# Patient Record
Sex: MALE | URBAN - METROPOLITAN AREA
[De-identification: names, ages, dates, MRNs, and addresses within clinical notes are randomized per-mention and may not be internally consistent; named-entity substitution may affect disease eponyms.]

---

## 2017-12-25 ENCOUNTER — HOSPITAL ENCOUNTER (EMERGENCY)
Facility: CLINIC | Age: 37
Discharge: HOME | End: 2017-12-25
Attending: EMERGENCY MEDICINE | Admitting: EMERGENCY MEDICINE

## 2017-12-25 ENCOUNTER — APPOINTMENT (OUTPATIENT)
Dept: GENERAL RADIOLOGY | Facility: CLINIC | Age: 37
End: 2017-12-25
Attending: EMERGENCY MEDICINE

## 2017-12-25 VITALS
DIASTOLIC BLOOD PRESSURE: 88 MMHG | SYSTOLIC BLOOD PRESSURE: 114 MMHG | OXYGEN SATURATION: 90 % | RESPIRATION RATE: 18 BRPM | TEMPERATURE: 97.4 F

## 2017-12-25 DIAGNOSIS — F10.920 ALCOHOLIC INTOXICATION WITHOUT COMPLICATION (H): ICD-10-CM

## 2017-12-25 DIAGNOSIS — R25.1 SHAKINESS: ICD-10-CM

## 2017-12-25 LAB
ALBUMIN SERPL-MCNC: 4 G/DL (ref 3.4–5)
ALP SERPL-CCNC: 132 U/L (ref 40–150)
ALT SERPL W P-5'-P-CCNC: 44 U/L (ref 0–70)
AMPHETAMINES UR QL SCN: NEGATIVE
ANION GAP SERPL CALCULATED.3IONS-SCNC: 12 MMOL/L (ref 3–14)
AST SERPL W P-5'-P-CCNC: 30 U/L (ref 0–45)
BARBITURATES UR QL: NEGATIVE
BASOPHILS # BLD AUTO: 0 10E9/L (ref 0–0.2)
BASOPHILS NFR BLD AUTO: 0.2 %
BENZODIAZ UR QL: NEGATIVE
BILIRUB SERPL-MCNC: 0.2 MG/DL (ref 0.2–1.3)
BUN SERPL-MCNC: 10 MG/DL (ref 7–30)
CALCIUM SERPL-MCNC: 8.4 MG/DL (ref 8.5–10.1)
CANNABINOIDS UR QL SCN: NEGATIVE
CHLORIDE SERPL-SCNC: 108 MMOL/L (ref 94–109)
CO2 SERPL-SCNC: 21 MMOL/L (ref 20–32)
COCAINE UR QL: NEGATIVE
CREAT SERPL-MCNC: 0.93 MG/DL (ref 0.66–1.25)
DIFFERENTIAL METHOD BLD: NORMAL
EOSINOPHIL # BLD AUTO: 0.2 10E9/L (ref 0–0.7)
EOSINOPHIL NFR BLD AUTO: 3.5 %
ERYTHROCYTE [DISTWIDTH] IN BLOOD BY AUTOMATED COUNT: 13.1 % (ref 10–15)
ETHANOL SERPL-MCNC: 0.22 G/DL
GFR SERPL CREATININE-BSD FRML MDRD: >90 ML/MIN/1.7M2
GLUCOSE SERPL-MCNC: 112 MG/DL (ref 70–99)
HCT VFR BLD AUTO: 47.9 % (ref 40–53)
HGB BLD-MCNC: 16 G/DL (ref 13.3–17.7)
IMM GRANULOCYTES # BLD: 0 10E9/L (ref 0–0.4)
IMM GRANULOCYTES NFR BLD: 0 %
LYMPHOCYTES # BLD AUTO: 1.6 10E9/L (ref 0.8–5.3)
LYMPHOCYTES NFR BLD AUTO: 33.1 %
MCH RBC QN AUTO: 29.9 PG (ref 26.5–33)
MCHC RBC AUTO-ENTMCNC: 33.4 G/DL (ref 31.5–36.5)
MCV RBC AUTO: 89 FL (ref 78–100)
MONOCYTES # BLD AUTO: 0.4 10E9/L (ref 0–1.3)
MONOCYTES NFR BLD AUTO: 7.9 %
NEUTROPHILS # BLD AUTO: 2.7 10E9/L (ref 1.6–8.3)
NEUTROPHILS NFR BLD AUTO: 55.3 %
NRBC # BLD AUTO: 0 10*3/UL
NRBC BLD AUTO-RTO: 0 /100
OPIATES UR QL SCN: NEGATIVE
PCP UR QL SCN: NEGATIVE
PLATELET # BLD AUTO: 255 10E9/L (ref 150–450)
POTASSIUM SERPL-SCNC: 3.2 MMOL/L (ref 3.4–5.3)
PROT SERPL-MCNC: 8.5 G/DL (ref 6.8–8.8)
RBC # BLD AUTO: 5.36 10E12/L (ref 4.4–5.9)
SODIUM SERPL-SCNC: 141 MMOL/L (ref 133–144)
TROPONIN I SERPL-MCNC: <0.015 UG/L (ref 0–0.04)
WBC # BLD AUTO: 4.8 10E9/L (ref 4–11)

## 2017-12-25 PROCEDURE — 80320 DRUG SCREEN QUANTALCOHOLS: CPT | Performed by: EMERGENCY MEDICINE

## 2017-12-25 PROCEDURE — 71020 XR CHEST 2 VW: CPT

## 2017-12-25 PROCEDURE — 85025 COMPLETE CBC W/AUTO DIFF WBC: CPT | Performed by: EMERGENCY MEDICINE

## 2017-12-25 PROCEDURE — 96360 HYDRATION IV INFUSION INIT: CPT

## 2017-12-25 PROCEDURE — 84484 ASSAY OF TROPONIN QUANT: CPT | Performed by: EMERGENCY MEDICINE

## 2017-12-25 PROCEDURE — 25000128 H RX IP 250 OP 636: Performed by: EMERGENCY MEDICINE

## 2017-12-25 PROCEDURE — 96361 HYDRATE IV INFUSION ADD-ON: CPT

## 2017-12-25 PROCEDURE — 93005 ELECTROCARDIOGRAM TRACING: CPT

## 2017-12-25 PROCEDURE — 99285 EMERGENCY DEPT VISIT HI MDM: CPT | Mod: 25

## 2017-12-25 PROCEDURE — 80307 DRUG TEST PRSMV CHEM ANLYZR: CPT | Performed by: EMERGENCY MEDICINE

## 2017-12-25 PROCEDURE — 80053 COMPREHEN METABOLIC PANEL: CPT | Performed by: EMERGENCY MEDICINE

## 2017-12-25 RX ADMIN — SODIUM CHLORIDE 1000 ML: 9 INJECTION, SOLUTION INTRAVENOUS at 18:02

## 2017-12-25 ASSESSMENT — ENCOUNTER SYMPTOMS
TREMORS: 1
ABDOMINAL PAIN: 0
CHOKING: 1
NERVOUS/ANXIOUS: 1
VOMITING: 0
NAUSEA: 0
SHORTNESS OF BREATH: 1

## 2017-12-25 NOTE — ED PROVIDER NOTES
"  History     Chief Complaint:  Shaking    The history is provided by the patient. A  was used.      Lamont Aguilar is a 37 year old male who presents to the emergency department today with fast heart rate and a feeling of shaky. The patient reports he is nervous, shaking, has a headache that started half an hour ago while the patient was eating and feels like he is choking with shortness of breath. The patient did take some tylenol afterwards and called his friend, who brought him to the ER. The patient denies vomiting, nausea, abdominal pain, history of diabetes, hypertension, asthma. Of note, the patient was drinking earlier today. Patient denies the usage of other illicit substances.      Allergies:  No Known Drug Allergies     Medications:    Atenolol    Past Medical History:    Hyperthyroidism    Past Surgical History:    History reviewed. No pertinent past surgical history.    Family History:    History reviewed. No pertinent family history.     Social History:  The patient was accompanied to the ED by friend.  Alcohol Use: Yes     Review of Systems   Respiratory: Positive for choking and shortness of breath.    Cardiovascular: Positive for chest pain.   Gastrointestinal: Negative for abdominal pain, nausea and vomiting.   Neurological: Positive for tremors.   Psychiatric/Behavioral: The patient is nervous/anxious.    All other systems reviewed and are negative.  Pt arrives with c/o shaking and \"choking\" sensation about 30 mins PTA. Last drink about 30 mins PTA.    Physical Exam     Patient Vitals for the past 24 hrs:   BP Temp Temp src Heart Rate Resp SpO2   12/25/17 1945 114/88 - - - - 90 %   12/25/17 1915 116/74 - - - - 95 %   12/25/17 1900 108/72 - - - - -   12/25/17 1845 107/71 - - - - 99 %   12/25/17 1830 100/63 - - - - 100 %   12/25/17 1815 115/67 - - - - 93 %   12/25/17 1745 - - - - - 96 %   12/25/17 1730 (!) 159/98 97.4  F (36.3  C) Oral 96 18 97 %        Physical Exam  GEN: " patient appears anxious, friend at the bedside.  Smells of alcohol  HEAD: atraumatic, normocephalic  EYES: pupils reactive (3plus to 2plus), extraocular muscles intact but horizontal nystagmus, conjunctivae normal  ENT: TMs flat and white bilaterally, oropharynx normal with no erythema or exudate, mucus membranes dry  NECK: no cervical LAD  RESPIRATORY: no tachypnea, breath sounds clear to auscultation (no rales, wheezes, rhonchi), chest wall nontender, normal phonation  CVS: normal S1/S2, no murmurs/rubs/gallops  ABDOMEN: soft, nontender, no masses or organomegaly, no rebound, decreased bowel sounds  BACK:  no spinal tenderness  EXTREMITIES: intact pulses x 4, full range of motion at joints, no edema  SKIN: warm and dry  NEURO: GCS 15, cranial nerves intact.  Motor- moves all 4 extremities with 5/5 strength.  Sensation- intact.  Coordination- ambulatory.  Overall symmetrical exam  HEME: no bruising or petechiae/contusions  LYMPH: no lymphadenopathy    Emergency Department Course     ECG:  Indication: shortness of breath   Completed at 1740.  Read at 1745.   Sinus Tachycardia   Right atrial enlargement   Nonspecific ST Abnormality  Abnormal ECG   Rate 106 bpm. WV interval 134. QRS duration 82. QT/QTc 348/462. P-R-T axes 36 58 39.   Normal axis.  No STEMI.    Imaging:  Radiology findings were communicated with the patient who voiced understanding of the findings.    XR Chest 2 views  IMPRESSION: Lung volumes are slightly low. No focal airspace opacity. No pleural effusion or pneumothorax. Cardiac silhouette within normal limits.  Report per radiology      Laboratory:  Laboratory findings were communicated with the patient who voiced understanding of the findings.  Drug abuse screen 77 urine: All Negative  Alcohol ethyl: 0.22(HH)  CBC: WNL (WBC 4.8, HGB 16.0, )  CMP: 112(H) Glucose, 3.1(L) Potassium, 8.4(L) Calcium o/w WNL (Creatinine 0.93)   Troponin (Collected 1748): <0.015     Interventions:  1802: 0.9% NaCl  "1L IV Bolus   [place]  Cardiac/Sp02 monitoring    Emergency Department Course:  Nursing notes and vitals reviewed.  1733: I performed an exam of the patient as documented above.   IV was inserted and blood was drawn for laboratory testing, results above.  The patient provided a urine sample here in the emergency department. This was sent for laboratory testing, findings above.  The patient was sent for a Chest XR while in the emergency department, results above.    1957: Patient rechecked and updated.    1956:Findings and plan explained to the Patient. Patient discharged home with instructions regarding supportive care, medications, and reasons to return. The importance of close follow-up was reviewed. I personally reviewed the laboratory and imaging results with the Patient and answered all related questions prior to discharge.   /88  Temp 97.4  F (36.3  C) (Oral)  Resp 18  SpO2 90%      Impression & Plan      Medical Decision Making:  Lamont Aguilar is a 37 year old male who admits to drinking alcohol today and was brought in by his friend because he was feeling \"weird, he felt very shaky, as though he was going to pass out, and felt as though it was hard to breathe. The patient had an IV placed and a  was used. EKG did not show any ST elevation myocardial infarction, buy there was sinus tachycardia. IV fluids were administered. Chest XR did not show any evidence of pneumothorax or infiltrate. His alcohol screen was positive at 0.22. He is not anemic and his electrolytes were normal. Troponin did not show any ischemia. The patient was observed for about 2 and a half hours in the ER and he felt better. He was no longer shaking, no longer shortness of breath, and his heart rate had decreased. He did have a sober friend to pick him up and take him home. He was advised to abstain from alcohol. Drugs of abuse is negative and patient denies taking any illicit substances.    Diagnosis:    " ICD-10-CM    1. Alcoholic intoxication without complication (H) F10.920    2. Shakiness R25.1        Disposition:  discharged to home    Scribe Disclosure:  I, Heaven Singh, am serving as a scribe at 6:00 PM on 12/25/2017 to document services personally performed by Krystal Lopez MD based on my observations and the provider's statements to me.     12/25/2017   [facility] EMERGENCY DEPARTMENT       Krystal Lopez MD  12/25/17 8818

## 2017-12-25 NOTE — ED AVS SNAPSHOT
[facility] Emergency Department    [address]    [facility] [address]    Phone:  [phone]    Fax:  [phone]                                       Lamont Aguilar   MRN: [id number]    Department:  [facility] Emergency Department   Date of Visit:  12/25/2017           After Visit Summary Signature Page     I have received my discharge instructions, and my questions have been answered. I have discussed any challenges I see with this plan with the nurse or doctor.    ..........................................................................................................................................  Patient/Patient Representative Signature      ..........................................................................................................................................  Patient Representative Print Name and Relationship to Patient    ..................................................               ................................................  Date                                            Time    ..........................................................................................................................................  Reviewed by Signature/Title    ...................................................              ..............................................  Date                                                            Time

## 2017-12-25 NOTE — ED AVS SNAPSHOT
[facility] Emergency Department    [address]    [facility] [address]    Phone:  [phone]    Fax:  [phone]                                       Lamont Aguilar   MRN: [id number]    Department:  [facility] Emergency Department   Date of Visit:  12/25/2017           Patient Information     Date Of Birth          1980        Your diagnoses for this visit were:     Alcoholic intoxication without complication (H)     Shakiness        You were seen by Krystal Lopez MD.      Follow-up Information     Follow up with Randi Mandujano    Specialty:  INTERNAL MEDICINE - ENDOCRINOLOGY, DIABETES & METABOLISM    Contact information:    [address] [facility] [address]  [address] [facility] [address]  [phone]          Discharge Instructions         Intoxicación Por Alcohol [Alcohol Intoxication]  La intoxicación por alcohol se da cuando usted juan alcohol más rápido de lo que el hígado puede trabajar para eliminarlo del sistema. La intoxicación por alcohol afecta morse juicio (pensamientos) y morse coordinación. Un nivel muy alto de alcohol en la ankita puede provocar un coma, hacer que la respiración se vuelva muy lenta, e incluso puede causarle la muerte.  Si reuben alcohol todos los días, eso puede ir, poco a poco, causándole daños permanentes en el hígado, el cerebro, el corazón, el páncreas y otros órganos. El consumo de alcohol sabrina el embarazo puede ocasionar daños permanentes al bebé que está creciendo.  Cuidados En [place]:    No joão más alcohol.    NO CONDUZCA hasta que hayan desaparecido los efectos del alcohol.    Descanse mucho en los próximos días. João abundantes líquidos y otras bebidas no alcohólicas. Trate de comer a intervalos regulares.    Si ha estado bebiendo mucho todos los días, es posible que sienta el sîndrome de abstinencia del alcohol (alcohol withdrawl). A esto también se lo conoce jennifer  la temblorina  (temblores van) o  " delirium tremens  (DTs). Los síntomas usuales kasper de 3 a 4 días y pueden incluir nerviosismo (nervousness), temblores (shakiness), náuseas (nausea), sudor (sweating) o insomnio (no poder dormir [sleeplessness]). Lo mejor sabrina sophie período es que usted permanezca con morse mervat o con amigos que puedan ayudarlo y contenerlo. También puede inscribirse en algún programa residencial de desintoxicación (residential detox program). Si esme síntomas son graves, comuníquese con morse médico para que le recete algún medicamento que pueda ayudarlo.  Visita De Control:  Si el alcohol le está causando problemas, alguna de las siguientes organizaciones, entre otras, pueden serle de ayuda:  Alcohólicos Anónimos (Alcoholics Anonymous) ofrece contención a través de grupos de autoayuda. No se cobran cuotas ni honorarios. Consulte las Páginas Anirudh y llame para conocer el horario y el lugar donde se realizan las reuniones. www.aa.org  Al-Anon ofrece apoyo para alcohólicos y familiares de alcohólicos. [phone] www.al-anon.org  National [place] On Alcoholism And Drug Dependence (Consejo Nacional sobre Alcoholismo y Drogadicción) 727.504.7909 www.ncadd.org  Existen también programas hospitalización o residenciales de desintoxicación alcohólica. Consulte las Páginas Anirudh, en la sección \"Drug Abuse & Treatment Centers\" (Drogadicción y Centros de Tratamiento).  Busque Prontamente Atención Médica  si algo de lo siguiente ocurre:    Temblores van o convulsiones (seizure).    Fiebre superior a los 100.4  F (38.0  C).    Confusión o alucinaciones (austin, oír, sentir cosas que no están ahí presentes).    Mayor dolor en la parte superior del abdomen.    Vómito persistente o presencia de ankita en el vómito.  Date Last Reviewed: 11/28/2013 2000-2017 [employer]. [address] [place] [address], [place] [address]. Todos los derechos reservados. Esta información no pretende sustituir la atención médica profesional. Sólo morse " médico puede diagnosticar y tratar un problema de sully.          24 Hour Appointment Hotline       To make an appointment at any [facility], call [phone] ([phone]). If you don't have a family doctor or clinic, we will help you find one. [facility] are conveniently located to serve the needs of you and your family.             Review of your medicines      Our records show that you are taking the medicines listed below. If these are incorrect, please call your family doctor or clinic.        Dose / Directions Last dose taken    ATENOLOL PO   Dose:  25 mg        Take 25 mg by mouth   Refills:  0                Procedures and tests performed during your visit     Alcohol ethyl    CBC with platelets differential    Comprehensive metabolic panel    Drug abuse screen 77 urine (FL, RH, SH)    EKG 12 lead    Troponin I    XR Chest 2 Views      Orders Needing Specimen Collection     None      Pending Results     Date and Time Order Name Status Description    12/25/2017 1739 EKG 12 lead Preliminary             Pending Culture Results     No orders found from 12/23/2017 to 12/26/2017.            Pending Results Instructions     If you had any lab results that were not finalized at the time of your Discharge, you can call the ED Lab Result RN at [phone]. You will be contacted by this team for any positive Lab results or changes in treatment. The nurses are available 7 days a week from 10A to 6:30P.  You can leave a message 24 hours per day and they will return your call.        Test Results From Your Hospital Stay        12/25/2017  6:32 PM      Component Results     Component Value Ref Range & Units Status    Ethanol g/dL 0.22 (H) <0.01 g/dL Final         12/25/2017  6:12 PM      Component Results     Component Value Ref Range & Units Status    WBC 4.8 4.0 - 11.0 10e9/L Final    RBC Count 5.36 4.4 - 5.9 10e12/L Final    Hemoglobin 16.0 13.3 - 17.7 g/dL Final    Hematocrit 47.9 40.0 - 53.0 % Final     MCV 89 78 - 100 fl Final    MCH 29.9 26.5 - 33.0 pg Final    MCHC 33.4 31.5 - 36.5 g/dL Final    RDW 13.1 10.0 - 15.0 % Final    Platelet Count 255 150 - 450 10e9/L Final    Diff Method Automated Method  Final    % Neutrophils 55.3 % Final    % Lymphocytes 33.1 % Final    % Monocytes 7.9 % Final    % Eosinophils 3.5 % Final    % Basophils 0.2 % Final    % Immature Granulocytes 0.0 % Final    Nucleated RBCs 0 0 /100 Final    Absolute Neutrophil 2.7 1.6 - 8.3 10e9/L Final    Absolute Lymphocytes 1.6 0.8 - 5.3 10e9/L Final    Absolute Monocytes 0.4 0.0 - 1.3 10e9/L Final    Absolute Eosinophils 0.2 0.0 - 0.7 10e9/L Final    Absolute Basophils 0.0 0.0 - 0.2 10e9/L Final    Abs Immature Granulocytes 0.0 0 - 0.4 10e9/L Final    Absolute Nucleated RBC 0.0  Final         12/25/2017  6:32 PM      Component Results     Component Value Ref Range & Units Status    Sodium 141 133 - 144 mmol/L Final    Potassium 3.2 (L) 3.4 - 5.3 mmol/L Final    Chloride 108 94 - 109 mmol/L Final    Carbon Dioxide 21 20 - 32 mmol/L Final    Anion Gap 12 3 - 14 mmol/L Final    Glucose 112 (H) 70 - 99 mg/dL Final    Urea Nitrogen 10 7 - 30 mg/dL Final    Creatinine 0.93 0.66 - 1.25 mg/dL Final    GFR Estimate >90 >60 mL/min/1.7m2 Final    Non  GFR Calc    GFR Estimate If Black >90 >60 mL/min/1.7m2 Final    African American GFR Calc    Calcium 8.4 (L) 8.5 - 10.1 mg/dL Final    Bilirubin Total 0.2 0.2 - 1.3 mg/dL Final    Albumin 4.0 3.4 - 5.0 g/dL Final    Protein Total 8.5 6.8 - 8.8 g/dL Final    Alkaline Phosphatase 132 40 - 150 U/L Final    ALT 44 0 - 70 U/L Final    AST 30 0 - 45 U/L Final         12/25/2017  6:32 PM      Component Results     Component Value Ref Range & Units Status    Troponin I ES <0.015 0.000 - 0.045 ug/L Final    The 99th percentile for upper reference range is 0.045 ug/L.  Troponin values   in the range of 0.045 - 0.120 ug/L may be associated with risks of adverse   clinical events.           12/25/2017   6:48 PM      Narrative     CHEST TWO VIEWS    12/25/2017 6:00 PM     HISTORY: Shortness of breath.    COMPARISON: None.        Impression     IMPRESSION: Lung volumes are slightly low. No focal airspace opacity.  No pleural effusion or pneumothorax. Cardiac silhouette within normal  limits.    MARY OLMOS MD         12/25/2017  6:44 PM      Component Results     Component Value Ref Range & Units Status    Amphetamine Qual Urine Negative NEG^Negative Final    Cutoff for a negative amphetamine is 500 ng/mL or less.    Barbiturates Qual Urine Negative NEG^Negative Final    Cutoff for a negative barbiturate is 200 ng/mL or less.    Benzodiazepine Qual Urine Negative NEG^Negative Final    Cutoff for a negative benzodiazepine is 200 ng/mL or less.    Cannabinoids Qual Urine Negative NEG^Negative Final    Cutoff for a negative cannabinoid is 50 ng/mL or less.    Cocaine Qual Urine Negative NEG^Negative Final    Cutoff for a negative cocaine is 300 ng/mL or less.    Opiates Qualitative Urine Negative NEG^Negative Final    Cutoff for a negative opiate is 300 ng/mL or less.    PCP Qual Urine Negative NEG^Negative Final    Cutoff for a negative PCP is 25 ng/mL or less.                Clinical Quality Measure: Blood Pressure Screening     Your blood pressure was checked while you were in the emergency department today. The last reading we obtained was  BP: 116/74 . Please read the guidelines below about what these numbers mean and what you should do about them.  If your systolic blood pressure (the top number) is less than 120 and your diastolic blood pressure (the bottom number) is less than 80, then your blood pressure is normal. There is nothing more that you need to do about it.  If your systolic blood pressure (the top number) is 120-139 or your diastolic blood pressure (the bottom number) is 80-89, your blood pressure may be higher than it should be. You should have your blood pressure rechecked within a year by a  "primary care provider.  If your systolic blood pressure (the top number) is 140 or greater or your diastolic blood pressure (the bottom number) is 90 or greater, you may have high blood pressure. High blood pressure is treatable, but if left untreated over time it can put you at risk for heart attack, stroke, or kidney failure. You should have your blood pressure rechecked by a primary care provider within the next 4 weeks.  If your provider in the emergency department today gave you specific instructions to follow-up with your doctor or provider even sooner than that, you should follow that instruction and not wait for up to 4 weeks for your follow-up visit.        Thank you for choosing [facility]       Thank you for choosing [facility] for your care. Our goal is always to provide you with excellent care. Hearing back from our patients is one way we can continue to improve our services. Please take a few minutes to complete the written survey that you may receive in the mail after you visit with us. Thank you!        [email or web address] Information     [employer] lets you send messages to your doctor, view your test results, renew your prescriptions, schedule appointments and more. To sign up, go to [email or web address] . Click on \"Log in\" on the left side of the screen, which will take you to the Welcome page. Then click on \"Sign up Now\" on the right side of the page.     You will be asked to enter the access code listed below, as well as some personal information. Please follow the directions to create your username and password.     Your access code is: [email or web address]  Expires: 3/25/2018  8:02 PM     Your access code will  in 90 days. If you need help or a new code, please call your [facility] clinic or [phone].        Care EveryWhere ID     This is your Care EveryWhere ID. This could be used by other organizations to access your [facility] medical records  [id number]        Equal Access to Services     TRISTON WARREN AH: " Hadii casie Craig, walisada jesus, qaelinta kaalbabs grajeda. So [facility] [phone].    ATENCIÓN: Si habla español, tiene a morse disposición servicios gratuitos de asistencia lingüística. Llame al [phone].    We comply with applicable federal civil rights laws and [place] laws. We do not discriminate on the basis of race, color, national origin, age, disability, sex, sexual orientation, or gender identity.            After Visit Summary       This is your record. Keep this with you and show to your community pharmacist(s) and doctor(s) at your next visit.

## 2017-12-25 NOTE — ED NOTES
"Pt arrives with c/o shaking and \"choking\" sensation about 30 mins PTA. Last drink about 30 mins PTA. ABC intact. No CP or SOB.   "

## 2017-12-26 LAB — INTERPRETATION ECG - MUSE: NORMAL

## 2017-12-26 NOTE — DISCHARGE INSTRUCTIONS
Intoxicación Por Alcohol [Alcohol Intoxication]  La intoxicación por alcohol se da cuando usted juan alcohol más rápido de lo que el hígado puede trabajar para eliminarlo del sistema. La intoxicación por alcohol afecta morse juicio (pensamientos) y morse coordinación. Un nivel muy alto de alcohol en la ankita puede provocar un coma, hacer que la respiración se vuelva muy lenta, e incluso puede causarle la muerte.  Si reuben alcohol todos los días, eso puede ir, poco a poco, causándole daños permanentes en el hígado, el cerebro, el corazón, el páncreas y otros órganos. El consumo de alcohol sabrina el embarazo puede ocasionar daños permanentes al bebé que está creciendo.  Cuidados En [place]:    No joão más alcohol.    NO CONDUZCA hasta que hayan desaparecido los efectos del alcohol.    Descanse mucho en los próximos días. João abundantes líquidos y otras bebidas no alcohólicas. Trate de comer a intervalos regulares.    Si ha estado bebiendo mucho todos los días, es posible que sienta el sîndrome de abstinencia del alcohol (alcohol withdrawl). A esto también se lo conoce jennifer  la temblorina  (temblores van) o  delirium tremens  (DTs). Los síntomas usuales kasper de 3 a 4 días y pueden incluir nerviosismo (nervousness), temblores (shakiness), náuseas (nausea), sudor (sweating) o insomnio (no poder dormir [sleeplessness]). Lo mejor sabrina sophie período es que usted permanezca con morse mervat o con amigos que puedan ayudarlo y contenerlo. También puede inscribirse en algún programa residencial de desintoxicación (residential detox program). Si esme síntomas son graves, comuníquese con morse médico para que le recete algún medicamento que pueda ayudarlo.  Visita De Control:  Si el alcohol le está causando problemas, alguna de las siguientes organizaciones, entre otras, pueden serle de ayuda:  Alcohólicos Anónimos (Alcoholics Anonymous) ofrece contención a través de grupos de autoayuda. No se cobran cuotas ni honorarios. Consulte  "las Páginas Anirudh y llame para conocer el horario y el lugar donde se realizan las reuniones. www.aa.org  Al-Anopetr ofrece apoyo para alcohólicos y familiares de alcohólicos. [phone] www.al-anon.org  National [place] On Alcoholism And Drug Dependence (Consejo Nacional sobre Alcoholismo y Drogadicción) 983.718.4540 www.ncadd.org  Existen también programas hospitalización o residenciales de desintoxicación alcohólica. Consulte las Páginas Anirudh, en la sección \"Drug Abuse & Treatment Centers\" (Drogadicción y Centros de Tratamiento).  Busque Prontamente Atención Médica  si algo de lo siguiente ocurre:    Temblores van o convulsiones (seizure).    Fiebre superior a los 100.4  F (38.0  C).    Confusión o alucinaciones (austin, oír, sentir cosas que no están ahí presentes).    Mayor dolor en la parte superior del abdomen.    Vómito persistente o presencia de ankita en el vómito.  Date Last Reviewed: 11/28/2013 2000-2017 [employer]. [address] [place] [id number]. Todos los derechos reservados. Esta información no pretende sustituir la atención médica profesional. Sólo morse médico puede diagnosticar y tratar un problema de sully.        "